# Patient Record
Sex: MALE | Race: BLACK OR AFRICAN AMERICAN
[De-identification: names, ages, dates, MRNs, and addresses within clinical notes are randomized per-mention and may not be internally consistent; named-entity substitution may affect disease eponyms.]

---
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
## 2019-02-02 ENCOUNTER — HOSPITAL ENCOUNTER (INPATIENT)
Dept: HOSPITAL 25 - ED | Age: 19
LOS: 1 days | Discharge: HOME | DRG: 343 | End: 2019-02-03
Attending: SURGERY | Admitting: SURGERY
Payer: COMMERCIAL

## 2019-02-02 DIAGNOSIS — Z79.899: ICD-10-CM

## 2019-02-02 DIAGNOSIS — K35.80: Primary | ICD-10-CM

## 2019-02-02 DIAGNOSIS — I10: ICD-10-CM

## 2019-02-02 LAB
ALBUMIN SERPL BCG-MCNC: 4.9 G/DL (ref 3.2–5.2)
ALBUMIN/GLOB SERPL: 1.9 {RATIO} (ref 1–3)
ALP SERPL-CCNC: 52 U/L (ref 34–104)
ALT SERPL W P-5'-P-CCNC: 11 U/L (ref 7–52)
ANION GAP SERPL CALC-SCNC: 8 MMOL/L (ref 2–11)
AST SERPL-CCNC: 17 U/L (ref 13–39)
BASOPHILS # BLD AUTO: 0 10^3/UL (ref 0–0.2)
BUN SERPL-MCNC: 19 MG/DL (ref 6–24)
BUN/CREAT SERPL: 16.8 (ref 8–20)
CALCIUM SERPL-MCNC: 9.5 MG/DL (ref 8.6–10.3)
CHLORIDE SERPL-SCNC: 105 MMOL/L (ref 101–111)
EOSINOPHIL # BLD AUTO: 0 10^3/UL (ref 0–0.6)
GLOBULIN SER CALC-MCNC: 2.6 G/DL (ref 2–4)
GLUCOSE SERPL-MCNC: 101 MG/DL (ref 70–100)
HCO3 SERPL-SCNC: 24 MMOL/L (ref 22–32)
HCT VFR BLD AUTO: 46 % (ref 42–52)
HGB BLD-MCNC: 15.8 G/DL (ref 14–18)
LYMPHOCYTES # BLD AUTO: 0.3 10^3/UL (ref 1–4.8)
MCH RBC QN AUTO: 29 PG (ref 27–31)
MCHC RBC AUTO-ENTMCNC: 34 G/DL (ref 31–36)
MCV RBC AUTO: 84 FL (ref 80–94)
MONOCYTES # BLD AUTO: 0.5 10^3/UL (ref 0–0.8)
NEUTROPHILS # BLD AUTO: 13.3 10^3/UL (ref 1.5–7.7)
NRBC # BLD AUTO: 0 10^3/UL
NRBC BLD QL AUTO: 0.2
PLATELET # BLD AUTO: 230 10^3/UL (ref 150–450)
POTASSIUM SERPL-SCNC: 4.3 MMOL/L (ref 3.5–5)
PROT SERPL-MCNC: 7.5 G/DL (ref 6.4–8.9)
RBC # BLD AUTO: 5.52 10^6/UL (ref 4–5.4)
SODIUM SERPL-SCNC: 137 MMOL/L (ref 135–145)
WBC # BLD AUTO: 14 10^3/UL (ref 3.5–10.8)

## 2019-02-02 PROCEDURE — 99284 EMERGENCY DEPT VISIT MOD MDM: CPT

## 2019-02-02 PROCEDURE — 83690 ASSAY OF LIPASE: CPT

## 2019-02-02 PROCEDURE — 80053 COMPREHEN METABOLIC PANEL: CPT

## 2019-02-02 PROCEDURE — 83605 ASSAY OF LACTIC ACID: CPT

## 2019-02-02 PROCEDURE — 81003 URINALYSIS AUTO W/O SCOPE: CPT

## 2019-02-02 PROCEDURE — 88304 TISSUE EXAM BY PATHOLOGIST: CPT

## 2019-02-02 PROCEDURE — 36415 COLL VENOUS BLD VENIPUNCTURE: CPT

## 2019-02-02 PROCEDURE — 85025 COMPLETE CBC W/AUTO DIFF WBC: CPT

## 2019-02-02 PROCEDURE — 74177 CT ABD & PELVIS W/CONTRAST: CPT

## 2019-02-02 NOTE — ED
Abdominal Pain/Male





- HPI Summary


HPI Summary: 


A 19 y/o M presents to ED with c/o diffuse suprapubic abd pain onset this AM. 

Pt states he had the pain upon waking today. Associated sx: n/v. He denies appy

, but did have surgery for testicular torsion. He denies testicular swelling. 











- History of Current Complaint


Chief Complaint: EDAbdPain


Stated Complaint: ABD PAIN/NAUSEA


Time Seen by Provider: 02/02/19 20:11


Hx Obtained From: Patient


Onset/Duration: Lasting Hours, Still Present


Timing: Constant


Severity Initially: Moderate


Severity Currently: Severe


Pain Intensity: 9


Pain Scale Used: 0-10 Numeric


Location: Diffuse


Character: Sharp


Associated Signs And Symptoms: Positive: Nausea, Vomiting.  Negative: Other - 

neg: testicular swelling





- Allergies/Home Medications


Allergies/Adverse Reactions: 


 Allergies











Allergy/AdvReac Type Severity Reaction Status Date / Time


 


No Known Allergies Allergy   Verified 02/02/19 19:59














PMH/Surg Hx/FS Hx/Imm Hx


Previously Healthy: Yes


Endocrine/Hematology History: 


   Denies: Hx Diabetes


Cardiovascular History: 


   Denies: Hx Hypertension


Sensory History: 


   Denies: Hx Deafness


Opthamlomology History: 


   Denies: Hx Legally Blind


Infectious Disease History: No


Infectious Disease History: 


   Denies: Traveled Outside the [place] in Last 30 Days





- Family History


Known Family History: Positive: Hypertension





- Social History


Occupation: Student


Lives: Dormitory/Roommates


Alcohol Use: None


Alcohol Amount: denies


Hx Substance Use: No


Substance Use Type: Reports: None


Substance Use Comment - Amount & Last Used: Adderall occassionally


Hx Tobacco Use: No


Smoking Status (MU): Never Smoked Tobacco





Review of Systems


Positive: Abdominal Pain, Vomiting, Nausea


Negative: other - neg: testicular swelling


All Other Systems Reviewed And Are Negative: Yes





Physical Exam





- Summary


Physical Exam Summary: 





Appearance: Well-appearing, Well-nourished, lying in bed comfortably


Skin: Warm, dry, no obvious rash


Eyes: sclera anicteric, no conjunctival pallor


ENT: mucous membranes moist, pharynx appears normal


Neck: Supple, nontender


Respiratory: Clear to auscultation, no signs of respiratory distress


Cardiovascular: Normal S1, S2. No murmurs. Normal distal pulses in tibial and 

radial bilaterally.


Abdomen: Soft, normal active bowel sounds present. RLQ tenderness with some 

guarding. No rebound. 


Musculoskeletal: Normal, Strength/ROM Intact


Neurological: A&Ox3, awake and alert, mentation is normal, speech is fluent and 

appropriate


Psychiatric: affect is normal, does not appear anxious or depressed





Testicular: No edema, enlargement or tenderness. 


Triage Information Reviewed: Yes


Vital Signs On Initial Exam: 


 Initial Vitals











Temp Pulse Resp BP Pulse Ox


 


 99.3 F   122   20   173/110   97 


 


 02/02/19 19:58  02/02/19 19:58  02/02/19 19:58  02/02/19 19:58  02/02/19 19:58











Vital Signs Reviewed: Yes





Diagnostics





- Vital Signs


 Vital Signs











  Temp Pulse Resp BP Pulse Ox


 


 02/02/19 19:58  99.3 F  122  20  173/110  97














- Laboratory


Result Diagrams: 


 02/02/19 20:37





 02/02/19 20:37


Lab Statement: Any lab studies that have been ordered have been reviewed, and 

results considered in the medical decision making process.





- CT


  ** A/P CT


CT Interpretation Completed By: Radiologist


Summary of CT Findings: IMPRESSION:  1. Mildly dilated thickwalled appendix 

concerning for early appendicitis. No.  perforation or abscess.  2. Trace free 

fluid in pelvis. ED provider has reviewed this report.





Re-Evaluation





- Re-Evaluation


  ** 1


Re-Evaluation Time: 21:14


Change: Improved


Comment: Pt feeling improved with medication. ABD exam shows RLQ tenderness 

with some guarding, no rebound. Testicular exam shows no edema, tenderness or 

enlargement.





  ** 2


Re-Evaluation Time: 01:22


Change: Improved


Comment: Discussing CT results and surgery consult with pt, and plans to admit 

and go to OR.





Abdominal Pain Fem Course/Dx





- Course


Course Of Treatment: Pt is a 19 y/o M presenting with diffuse suprapubic abd 

pain onset this AM. Pt states he had the pain upon waking today. Associated sx: 

n/v. He denies appy, but did have surgery for testicular torsion. He denies 

testicular swelling.  Labs are without significant abnormalities. A/P CT 

reveals "1. Mildly dilated thickwalled appendix concerning for early 

appendicitis. No perforation or abscess. 2. Trace free fluid in pelvis.".  

Consulted with Dr. Henry, surgery, who will admit patient and prep for OR.





- Diagnoses


Provider Diagnoses: 


 Acute appendicitis








- Provider Notifications


Discussed Care Of Patient With: Brian Henry - surgery


Time Discussed With Above Provider: 01:21


Instructed by Provider To: Admit As Inpatient





Discharge





- Sign-Out/Discharge


Documenting (check all that apply): Patient Departure - ADMIT, OR


Patient Received Moderate/Deep Sedation with Procedure: No





- Discharge Plan


Condition: Stable


Disposition: ADMITTED TO [facility]





- Billing Disposition and Condition


Condition: STABLE


Disposition: Admitted to [facility]





- Attestation Statements


Document Initiated by Auguste: Yes


Documenting Scribe: Johnna Peralta


Provider For Whom Nandini is Documenting (Include Credential): Dr. Partha Hendricks MD


Scribe Attestation: 


Johnna FIGUEROA, scribed for Dr. Partha Hendricks MD on 02/03/19 at 0526. 


Scribe Documentation Reviewed: Yes


Provider Attestation: 


The documentation as recorded by the Johnna sheldon accurately 

reflects the service I personally performed and the decisions made by me, Dr. Partha Hendricks MD


Status of Scribe Document: Viewed

## 2019-02-03 VITALS — DIASTOLIC BLOOD PRESSURE: 77 MMHG | SYSTOLIC BLOOD PRESSURE: 138 MMHG

## 2019-02-03 PROCEDURE — 0DTJ4ZZ RESECTION OF APPENDIX, PERCUTANEOUS ENDOSCOPIC APPROACH: ICD-10-PCS | Performed by: SURGERY

## 2019-02-03 NOTE — BRIEFOPN
Brief Operative Note





- Surgery


Procedures: 





Pre-OP Diagnoses:   acute appendicitis





Post-op Diagnosis:   same





Procedure:      Laparoscopic appendectomy





Surgeon:       Carl





Asst:       none





Anethesia:       GETA  





EBL:      minimal





IVF:      crystalloid





Specimen:      appendix





Drains:      none

## 2019-02-03 NOTE — HP
H&P (Free Text)


History and Physical: 





I saw and evaluated the patient. H and P dictated.





18 y.o male with HTN and acute appendicitis.





Plan: laparoscopic appendectomy; r/b/a idscussed and pt wishes to proceed.

## 2019-02-03 NOTE — OP
CC:  Willi Zambrano MD; [facility] *

 

DATE OF OPERATION:  02/03/19 - ROOM #332

 

DATE OF BIRTH:  [birth year]

 

SURGEON:  Brian Henry MD.

 

ASSISTANT:  None.

 

ANESTHESIOLOGIST:  Dr. Malave.

 

ANESTHESIA:  General.

 

PRE-OP DIAGNOSIS:  Acute appendicitis.

 

POST-OP DIAGNOSIS:  Acute appendicitis.

 

OPERATIVE PROCEDURE:  Laparoscopic appendectomy.

 

ESTIMATED BLOOD LOSS:  Minimal blood loss.

 

FLUIDS:  Minimal crystalloid fluid given.

 

SPECIMEN:  Appendix.

 

DESCRIPTION OF PROCEDURE:  The patient was brought to the operating room, 
placed on the operating table in supine position.  The patient had already 
received antibiotics.  General anesthesia was induced.  The patient's abdomen 
was clipped of hair and prepped and draped in a standard surgical fashion and a 
time-out was performed.

 

An infraumbilical incision was made.  The skin was elevated and a Veress needle 
inserted into the abdominal cavity, which was then allowed to insufflate to 
pressure of 15 mmHg.  The patient tolerated the insufflation well.  Veress 
needle was moved and a 5 mm OptiView trocar was inserted appropriately into the 
abdomen. Laparoscope was inserted through this.  There was no evidence of 
injury from the trocar insertion.  There was no evidence of bleeding.  
Additional trocars were placed in the following positions:  A 5 mm in a 
suprapubic area and a 5 mm in the left lower quadrant.  Review of the abdomen 
showed some scant free fluid that was nonpurulent.  The appendix was identified 
and was peritonealized at the lateral aspect of the cecum.  It hooked up from 
the base.  It was easily identifiable to the tip.  It was not identifiable 
until we rotated the cecum more medially. Dissection was carried out taking the 
lateral attachments with LigaSure.  We then came along the turn of the appendix 
overlying the cecum and we were able to at this point elongate the inflamed-
appearing appendix.  The LigaSure was used to take the mesentery of the 
appendix in a stepwise state and then we used a 0-Vicryl Endoloop at the base 
of the appendix to healthy tissue.  Another Endoloop was placed and we cut in 
between the site.  The mucosa later was cauterized. The appendix was placed in 
endoscopic retrieval bag.  Reviewed the abdomen and there was no evidence of 
injury.  There was no soilage and we removed the appendix through the 5 mm 
umbilical port site in its endoscopic retrieval bag.  The abdomen was allowed 
to collapse.  Trocar removed under direct vision and all 3 skin incisions were 
reapproximated with 4-0 Monocryl subcuticular sutures followed by Steri-Strips 
and sterile dressing.  The patient tolerated the procedure well and was woken 
in the OR and transferred to the PACU.

 

 [id number]/CPS #: [id number]

ADONIS

## 2019-02-03 NOTE — HP
CC:  Willi Zambrano at [facility].

 

HISTORY AND PHYSICAL:

 

DATE OF ADMISSION:  02/03/19

 

HISTORY OF PRESENT ILLNESS:  Mr. 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 
a history of hypertension, on 2 medications, who presented to the emergency room last night with comp
laints of lower abdominal pain for half a day. Patient's workup in the ER including labs and CAT scan
 were consistent with acute appendicitis and I was contacted.  Patient was admitted to my service in 
the overnight, I see him right now.

 

Patient describes acute onset of pain about 5 a.m. yesterday, woke up, and had intermittent nausea an
d vomiting afterwards as well as loose bowel movements, decreased appetite.  Denied any fevers or chi
lls, but he has had fever in the overnight.  Patient denies any previous similar symptoms.  He was co
ncerned that the pain at first was similar to testicular torsion that he suffered years ago. Patient 
underwent a successful detorsion with suturing without orchiectomy. Patient currently has no pain in 
the scrotum.

 

Pain is relieved with rest, worse with movement, it is improved with narcotics.

 

PAST MEDICAL HISTORY:  Hypertension.

 

PAST SURGICAL HISTORY:  As above.

 

MEDICATIONS:  1.  Amlodipine.

2.  Losartan.  Patient follows [facility] with regards to his hypertension, has undergone multiple labs 
through there.

 

ALLERGIES:  No known drug allergies.

 

FAMILY HISTORY:  Noncontributory.  No family history of ulcerative colitis or Crohn's disease.

 

SOCIAL HISTORY:  Nonsmoker, nondrinker. 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 He is studying [employer].

 

REVIEW OF SYSTEMS:  No headaches, no shortness of breath.  No fevers except now objectively here in [facility].  No GERD, no irritable bowel symptoms.  Loose bowel movements and vomiting as described.
  No bleeding or clotting disorders. Hypertension as described.  No metabolic disorders.  No psychiat
ruby illnesses.  No significant weight loss or weight gain.

 

                               PHYSICAL EXAMINATION

 

VITAL SIGNS:  Temperature 101.8, heart rate 109, blood pressure 150/67. He is alert and oriented x3, 
in no apparent distress.

 

HEAD, EYES, EARS, NOSE, AND THROAT:  Normocephalic, atraumatic.  Sclerae are anicteric.  Mucous membr
anes are dry.

 

NECK:  No lymphadenopathy.

 

LUNGS:  Clear.

 

ABDOMEN:  Soft, nondistended.  Tender in the right lower quadrant without rebound. Positive voluntary
 guarding.  No CVA tenderness.

 

RECTAL EXAM:  Not performed.

 

EXTREMITIES:  Within normal limits with no pitting edema.  Negative Rovsing or psoas sign.

 

 DIAGNOSTIC STUDIES/LAB DATA:  Labs reviewed show white count elevated 14 with a left shift.  No anem
ia. Metabolic panel reviewed creatinine 1.13, which is upper limit of normal with an estimated GFR of
 100.  Lactic acid normal.  Urinalysis with positive ketones.

 

Patient underwent a CAT scan of the abdomen and pelvis.  These images as well as report were reviewed
.  Positive fat, mildly dilated appendix with thick wall.

 

IMPRESSION:  Acute appendicitis, sepsis criteria with fever and elevated heart rate with white count 
elevated with left shift.  Recommendations for antibiotics and has been started.  Patient has already
 received a second dose of Zosyn. I have recommended laparoscopic appendectomy.  I went over the deta
ils of the procedure outlying the laparoscopic approach.  Patient wishes to proceed.  I spoke with po
ssible complications, which include not limited bleeding, infection, bowel injury, abscess formation,
 need for open procedure, need for additional procedures. We also talked about the possible alternati
ves of watchful waiting, and antibiotics alone.  This was not recommended.  Patient did not choose to
 go in this fashion. He maintained n.p.o. status, he is on IV fluids and we will take him emergently 
to the operating room for laparoscopic appendectomy.

 

 

 

[id number]/[facility] #: [id number]

## 2019-02-18 ENCOUNTER — HOSPITAL ENCOUNTER (OUTPATIENT)
Dept: HOSPITAL 25 - ED | Age: 19
Setting detail: OBSERVATION
LOS: 2 days | Discharge: HOME | End: 2019-02-20
Attending: SURGERY | Admitting: SURGERY
Payer: COMMERCIAL

## 2019-02-18 DIAGNOSIS — R10.9: ICD-10-CM

## 2019-02-18 DIAGNOSIS — T81.43XA: Primary | ICD-10-CM

## 2019-02-18 DIAGNOSIS — K65.1: ICD-10-CM

## 2019-02-18 DIAGNOSIS — R19.7: ICD-10-CM

## 2019-02-18 DIAGNOSIS — Z90.89: ICD-10-CM

## 2019-02-18 LAB
ANION GAP SERPL CALC-SCNC: 8 MMOL/L (ref 2–11)
BASOPHILS # BLD AUTO: 0.1 10^3/UL (ref 0–0.2)
BUN SERPL-MCNC: 13 MG/DL (ref 6–24)
BUN/CREAT SERPL: 12.5 (ref 8–20)
CALCIUM SERPL-MCNC: 10 MG/DL (ref 8.6–10.3)
CHLORIDE SERPL-SCNC: 101 MMOL/L (ref 101–111)
EOSINOPHIL # BLD AUTO: 0.1 10^3/UL (ref 0–0.6)
GLUCOSE SERPL-MCNC: 88 MG/DL (ref 70–100)
HCO3 SERPL-SCNC: 28 MMOL/L (ref 22–32)
HCT VFR BLD AUTO: 43 % (ref 42–52)
HGB BLD-MCNC: 14.7 G/DL (ref 14–18)
LYMPHOCYTES # BLD AUTO: 2.1 10^3/UL (ref 1–4.8)
MCH RBC QN AUTO: 28 PG (ref 27–31)
MCHC RBC AUTO-ENTMCNC: 34 G/DL (ref 31–36)
MCV RBC AUTO: 83 FL (ref 80–94)
MONOCYTES # BLD AUTO: 1.4 10^3/UL (ref 0–0.8)
NEUTROPHILS # BLD AUTO: 7.3 10^3/UL (ref 1.5–7.7)
NRBC # BLD AUTO: 0 10^3/UL
NRBC BLD QL AUTO: 0.3
PLATELET # BLD AUTO: 303 10^3/UL (ref 150–450)
POTASSIUM SERPL-SCNC: 3.7 MMOL/L (ref 3.5–5)
RBC # BLD AUTO: 5.23 10^6/UL (ref 4–5.4)
SODIUM SERPL-SCNC: 137 MMOL/L (ref 135–145)
WBC # BLD AUTO: 11 10^3/UL (ref 3.5–10.8)

## 2019-02-18 PROCEDURE — 99284 EMERGENCY DEPT VISIT MOD MDM: CPT

## 2019-02-18 PROCEDURE — 72193 CT PELVIS W/DYE: CPT

## 2019-02-18 PROCEDURE — 36415 COLL VENOUS BLD VENIPUNCTURE: CPT

## 2019-02-18 PROCEDURE — 76705 ECHO EXAM OF ABDOMEN: CPT

## 2019-02-18 PROCEDURE — 96366 THER/PROPH/DIAG IV INF ADDON: CPT

## 2019-02-18 PROCEDURE — 96365 THER/PROPH/DIAG IV INF INIT: CPT

## 2019-02-18 PROCEDURE — 96375 TX/PRO/DX INJ NEW DRUG ADDON: CPT

## 2019-02-18 PROCEDURE — 86140 C-REACTIVE PROTEIN: CPT

## 2019-02-18 PROCEDURE — 74018 RADEX ABDOMEN 1 VIEW: CPT

## 2019-02-18 PROCEDURE — G0378 HOSPITAL OBSERVATION PER HR: HCPCS

## 2019-02-18 PROCEDURE — 80048 BASIC METABOLIC PNL TOTAL CA: CPT

## 2019-02-18 PROCEDURE — 85025 COMPLETE CBC W/AUTO DIFF WBC: CPT

## 2019-02-18 RX ADMIN — OXYCODONE HYDROCHLORIDE AND ACETAMINOPHEN PRN TAB: 5; 325 TABLET ORAL at 22:52

## 2019-02-18 RX ADMIN — PIPERACILLIN AND TAZOBACTAM SCH MLS/HR: 3; .375 INJECTION, POWDER, LYOPHILIZED, FOR SOLUTION INTRAVENOUS; PARENTERAL at 21:39

## 2019-02-18 NOTE — HP
HISTORY AND PHYSICAL:

 

DATE OF ADMISSION:  02/18/19.

 

CHIEF COMPLAINT:  Right lower quadrant abdominal pain.

 

HISTORY OF PRESENT ILLNESS:  Mr. 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 
intermittent crampy abdominal pain and some right lower quadrant discomfort.  
He has not had any fevers, shakes, or chills or nausea, but he did feel some 
anorexia with poor oral intake in the last several days.  He had some looser 
bowel movements later last week, but has not had a bowel movement in several 
days.

 

Of note, he underwent a laparoscopic appendectomy with Dr. Henry on 02/03/19.  
Pathology did show acute early appendicitis and this was done with a 
laparoscope. He did well and was discharged home on postoperative day #1 
without antibiotics.

 

When seen in the emergency room today, he was noted to be afebrile with stable 
vital signs.  Laboratory workup included a white blood cell count of 11,000 
without shift.  He was noted to have a C-reactive protein of 45.

 

He was noted to have some mild tenderness in the right lower quadrant with some 
voluntary muscle rigidity.

 

He underwent an ultrasound of his abdomen.  I did review these studies as well 
as review them with radiologist, Dr. Forde today.  This shows a small volume 
of right lower quadrant free fluid, but in addition there is a thick-walled 3.8 
x 3.2 x 3.7 cm complex fluid and debris collection consistent possibly with 
abscess versus phlegmon.

 

Surgical consultation was obtained.

 

PAST MEDICAL HISTORY:  Hypertension.

 

PAST SURGICAL HISTORY:  Laparoscopic appendectomy.

 

MEDICATIONS:  Include amlodipine and losartan.

 

ALLERGIES:  He has no known drug allergies.

 

SOCIAL HISTORY:  He is a nonsmoker and nondrinker.  He is a sophomore.  He is 
from the [facility] and his parents lived there.

 

REVIEW OF SYSTEMS:  Otherwise unremarkable.

 

                               PHYSICAL EXAMINATION

 

GENERAL:  He is a slender, well-developed, well-nourished male, appears to be 
in no apparent distress.

 

VITAL SIGNS:  Temperature 98.8, pulse 63, blood pressure 148/78.

 

LUNGS:  Clear to auscultation with normal respiratory effort.

 

HEART:  Regular rate and rhythm without murmurs, rubs, or gallops.

 

ABDOMEN:  Soft and nondistended.  He has normoactive bowel sounds throughout.  
They were slightly hyperactive.  He has well-healed laparoscopic incisions 
without hernia or redness.  He has some mild tenderness in the right lower 
quadrant with some voluntary guarding.  There is no peritoneal irritation.  
There is no generalized peritonitis.

 

EXTREMITIES:  Show no cyanosis or edema.

 

 IMPRESSION:  Status post laparoscopic appendectomy almost 2 weeks ago with a 
return to the emergency room, several days of intermittent crampy abdominal 
discomfort and anorexia.  He is noted to have a mild white blood cell count 
elevation and elevated CRP.

 

Ultrasound as per above.

 

I reviewed the ultrasound with Dr. Forde here from Interventional Radiology.  
It is difficult to determine with the ultrasound whether the phlegmon is 
possibly an abscess and there also appears to be some free fluid, which is hard 
to distinguish on the ultrasound.

 

PLAN:

1.  The patient will be admitted to the surgical service in the short-stay unit.

2.  He will be started on IV Zosyn.

3.  He will be kept n.p.o. for now.

4.  IV fluids will be started.

5.  After discussion with Dr. Forde, we will proceed with a CT scan of the 
pelvis with oral contrast, allowing the contrast to pass down through the 
terminal ileum and right colon for optimal visualization and elucidation of a 
possible abscess versus phlegmon.  This may be amenable to percutaneous 
drainage.

 

All of the above was discussed with the patient and his questions were 
answered.  I also offered to call his parents, but he states that they are 
aware of his hospitalization and he did not request me to call them at this 
point.

 

 

 

[id number]/[facility]PS #: [id number]

MTDD

## 2019-02-18 NOTE — ED
Abdominal Pain/Male





- HPI Summary


HPI Summary: 


Pt is a 19 y/o male who presents to the ED c/o abdominal pain. He had an 

appendectomy 2 weeks ago without any complications, and was on post-operative 

pain medications. Pt has been having intermittent diffuse abdominal pain for 

the past 6 days, rated 8/10 in severity at its worst. The pain is made worse 

with movement. He also notes mild diarrhea. Pt denies any N/V, back pain, 

testicular pain, fever, or hematochezia.





- History of Current Complaint


Chief Complaint: EDAbdPain


Stated Complaint: ABD PAIN


Time Seen by Provider: 02/18/19 13:52


Hx Obtained From: Patient


Onset/Duration: Gradual Onset, Lasting Days - 6, Still Present


Timing: Intermittent


Severity Currently: Severe


Pain Intensity: 8


Pain Scale Used: 0-10 Numeric


Location: Diffuse


Radiates: No


Aggravating Factor(s): Movement


Alleviating Factor(s): Nothing


Associated Signs And Symptoms: Positive: Diarrhea.  Negative: Fever, Back Pain, 

Blood in Stool, Nausea, Vomiting





- Allergies/Home Medications


Allergies/Adverse Reactions: 


 Allergies











Allergy/AdvReac Type Severity Reaction Status Date / Time


 


No Known Allergies Allergy   Verified 02/18/19 14:05











Home Medications: 


 Home Medications





Valsartan TAB* [Diovan TAB*] 160 mg PO DAILY 02/18/19 [History Confirmed 02/18/ 19]


amLODIPine TAB* [Norvasc 5 mg TAB*] 7.5 mg PO DAILY 02/18/19 [History Confirmed 

02/18/19]











PMH/Surg Hx/FS Hx/Imm Hx


Endocrine/Hematology History: 


   Denies: Hx Diabetes


Cardiovascular History: 


   Denies: Hx Hypertension


Sensory History: Reports: Hx Contacts or Glasses


   Denies: Hx Legally Blind, Hx Deafness, Hx Hearing Aid


Opthamlomology History: Reports: Hx Contacts or Glasses


   Denies: Hx Legally Blind





- Immunization History


Date of Tetanus Vaccine: UTD


Infectious Disease History: No


Infectious Disease History: 


   Denies: Traveled Outside the [place] in Last 30 Days





- Family History


Known Family History: Positive: Hypertension





- Social History


Alcohol Use: None


Alcohol Amount: denies


Hx Substance Use: No


Substance Use Type: Reports: None


Substance Use Comment - Amount & Last Used: Adderall occassionally


Hx Tobacco Use: No


Smoking Status (MU): Never Smoked Tobacco





Review of Systems


Negative: Fever


Positive: Abdominal Pain, Diarrhea.  Negative: Vomiting, Nausea, Other - 

hematochezia


Negative: other - testicular pain


Negative: Myalgia - back pain


All Other Systems Reviewed And Are Negative: Yes





Physical Exam





- Summary


Physical Exam Summary: 


Appearance: Well appearing, no pain distress


Skin: warm, dry, reflects adequate perfusion


Head/face: normal


Eyes: EOMI, BAKARI


ENT: mucous membranes moist


Neck: supple, non-tender


Respiratory: CTA, breath sounds present


Cardiovascular: RRR, pulses symmetrical 


Abdomen: soft, mild guarding, mild diffuse tenderness worse in the RLQ


Bowel Sounds: present


Musculoskeletal: normal, strength/ROM intact


Neuro: normal, sensory motor intact, A&Ox3


Triage Information Reviewed: Yes


Vital Signs On Initial Exam: 


 Initial Vitals











Temp Pulse Resp BP Pulse Ox


 


 98.6 F   71   14   153/102   99 


 


 02/18/19 13:05  02/18/19 13:05  02/18/19 13:05  02/18/19 13:05  02/18/19 13:05











Vital Signs Reviewed: Yes





Diagnostics





- Vital Signs


 Vital Signs











  Temp Pulse Resp BP Pulse Ox


 


 02/18/19 13:05  98.6 F  71  14  153/102  99














- Laboratory


Result Diagrams: 


 02/18/19 14:15





 02/18/19 14:15


Lab Statement: Any lab studies that have been ordered have been reviewed, and 

results considered in the medical decision making process.





- Radiology


  ** Abdomen XR


Radiology Interpretation Completed By: Radiologist


Summary of Radiographic Findings: NONSPECIFIC BOWEL GAS PATTERN. ED physician 

reviewed radiology report.





- Ultrasound


  ** No standard instances


Ultrasound Interpretation Completed By: Radiologist


Summary of Ultrasound Findings: Abdomen US: The constellation of findings given 

the clinical context favors a small loculated abscess collection at the RIGHT 

lower quadrant with estimated volume of approximately 15-20 mL. Small volume of 

nonloculated RIGHT lower quadrant free fluid. ED physician reviewed radiology 

report.





Abdominal Pain Fem Course/Dx





- Course


Course Of Treatment: Nurse's notes removed.  Patient with pain following 

appendectomy.  Ultrasound reveals a fluid collection in that area that could be 

abscess versus seroma.  Discussed with surgery.  Wbc, CRP are elevated.  They 

will discuss with interventional radiology to see best way to sample/strain 

this.  Dose of IV Zosyn given here.  Admit for further.





- Diagnoses


Differential Diagnosis/HQI/PQRI: Other - Postsurgical constipation, abscess, 

bowel obstruction, ileus


Provider Diagnoses: 


 Abdominal abscess








- Provider Notifications


Discussed Care Of Patient With: Nabil Corbett


Time Discussed With Above Provider: 14:57


Instructed by Provider To: Admit As Inpatient - Give the pt Zosyn.





Discharge





- Sign-Out/Discharge


Documenting (check all that apply): Patient Departure - Admit


Patient Received Moderate/Deep Sedation with Procedure: No





- Discharge Plan


Condition: Fair


Disposition: ADMITTED TO [facility]





- Billing Disposition and Condition


Condition: FAIR


Disposition: Admitted to [facility]





- Attestation Statements


Document Initiated by Scribe: Yes


Documenting Scribe: Viola Zhou


Provider For Whom Scribe is Documenting (Include Credential): Dominick Dixon MD


Scribe Attestation: 


Viola FIGUEROA, scribed for Dominick Dixon MD on 02/18/19 at 1618. 


Scribe Documentation Reviewed: Yes


Provider Attestation: 


The documentation as recorded by the Viola sheldon accurately reflects the 

service I personally performed and the decisions made by me, Dominick Dixon MD


Status of Scribe Document: Viewed

## 2019-02-19 RX ADMIN — PIPERACILLIN AND TAZOBACTAM SCH MLS/HR: 3; .375 INJECTION, POWDER, LYOPHILIZED, FOR SOLUTION INTRAVENOUS; PARENTERAL at 21:03

## 2019-02-19 RX ADMIN — OXYCODONE HYDROCHLORIDE AND ACETAMINOPHEN PRN TAB: 5; 325 TABLET ORAL at 18:22

## 2019-02-19 RX ADMIN — PIPERACILLIN AND TAZOBACTAM SCH MLS/HR: 3; .375 INJECTION, POWDER, LYOPHILIZED, FOR SOLUTION INTRAVENOUS; PARENTERAL at 05:18

## 2019-02-19 RX ADMIN — OXYCODONE HYDROCHLORIDE AND ACETAMINOPHEN PRN TAB: 5; 325 TABLET ORAL at 14:23

## 2019-02-19 RX ADMIN — OXYCODONE HYDROCHLORIDE AND ACETAMINOPHEN PRN TAB: 5; 325 TABLET ORAL at 05:21

## 2019-02-19 RX ADMIN — PIPERACILLIN AND TAZOBACTAM SCH MLS/HR: 3; .375 INJECTION, POWDER, LYOPHILIZED, FOR SOLUTION INTRAVENOUS; PARENTERAL at 14:18

## 2019-02-19 NOTE — PN
Progress Note





- Progress Note


Date of Service: 02/19/19


Note: 





S: Feels about the same. Rates pain at 7/10, but has only req'd Percocet x 2. 

No N/V. Would like to eat (willard clears well). No flatus, but is passing liquid 

stool.





O: 


 Vital Signs - 8 hr











  02/19/19 02/19/19 02/19/19





  03:56 05:18 05:21


 


Temperature 98.2 F  


 


Pulse Rate 55  


 


Respiratory 16 17 17





Rate   


 


Blood Pressure 123/61  





(mmHg)   


 


O2 Sat by Pulse 99  





Oximetry   














  02/19/19 02/19/19





  07:21 07:27


 


Temperature  98.1 F


 


Pulse Rate  53


 


Respiratory 16 16





Rate  


 


Blood Pressure  122/63





(mmHg)  


 


O2 Sat by Pulse  99





Oximetry  








Intake and Output Last 24 Hours











 02/17/19 02/18/19 02/19/19 02/20/19





 06:59 06:59 06:59 06:59


 


Intake Total   1635 105


 


Output Total   700 


 


Balance   935 105


 


Weight   152 lb 


 


Intake:    


 


  IV Fluids   935 


 


    LR   835 


 


  IVPB   100 105


 


    ABX - ZOSYN   100 105


 


  Oral   600 


 


Output:    


 


  Urine   700 








Gen: WN; appears comfortable


Heart: reg


Lungs: clear


Abd: flat, nondistended; +BS; soft; moderate tenderness w/ vol guarding RLQ, 

just over anterior iliac spine; remainder w/o sig tenderness





No labs this a.m.





A: intra-abd/pelvic abscess; small





P: discussed w/ Kiet Henry and Aric; cont IV abx (Zosyn); adv diet; labs 

tomorrow a.m. Pt understands the potential need for perc drainage.

## 2019-02-20 VITALS — SYSTOLIC BLOOD PRESSURE: 125 MMHG | DIASTOLIC BLOOD PRESSURE: 69 MMHG

## 2019-02-20 LAB
BASOPHILS # BLD AUTO: 0 10^3/UL (ref 0–0.2)
EOSINOPHIL # BLD AUTO: 0.2 10^3/UL (ref 0–0.6)
HCT VFR BLD AUTO: 40 % (ref 42–52)
HGB BLD-MCNC: 13.8 G/DL (ref 14–18)
LYMPHOCYTES # BLD AUTO: 1.8 10^3/UL (ref 1–4.8)
MCH RBC QN AUTO: 28 PG (ref 27–31)
MCHC RBC AUTO-ENTMCNC: 35 G/DL (ref 31–36)
MCV RBC AUTO: 81 FL (ref 80–94)
MONOCYTES # BLD AUTO: 0.8 10^3/UL (ref 0–0.8)
NEUTROPHILS # BLD AUTO: 4.1 10^3/UL (ref 1.5–7.7)
NRBC # BLD AUTO: 0 10^3/UL
NRBC BLD QL AUTO: 0.1
PLATELET # BLD AUTO: 264 10^3/UL (ref 150–450)
RBC # BLD AUTO: 4.91 10^6/UL (ref 4–5.4)
WBC # BLD AUTO: 6.9 10^3/UL (ref 3.5–10.8)

## 2019-02-20 RX ADMIN — OXYCODONE HYDROCHLORIDE AND ACETAMINOPHEN PRN TAB: 5; 325 TABLET ORAL at 01:31

## 2019-02-20 RX ADMIN — OXYCODONE HYDROCHLORIDE AND ACETAMINOPHEN PRN TAB: 5; 325 TABLET ORAL at 05:46

## 2019-02-20 RX ADMIN — PIPERACILLIN AND TAZOBACTAM SCH MLS/HR: 3; .375 INJECTION, POWDER, LYOPHILIZED, FOR SOLUTION INTRAVENOUS; PARENTERAL at 05:46

## 2019-02-20 NOTE — DS
CC:  [facility] *

 

DISCHARGE SUMMARY:

 

DATE OF ADMISSION:

 

DATE OF DISCHARGE:  02/20/19

 

HOSPITAL COURSE:  is a 18-year-old gentleman, [employer] student, who 
presented to our institution on 02/03/19 with abdominal pain, was worked up 
with a diagnosis consistent with acute appendicitis.  He went to the operating 
room on 02/03/19 and underwent a laparoscopic appendectomy.  Please see report 
for details. The patient was discharged from the PACU on no antibiotics for 
what appeared to be an appendicitis.

 

The patient did well in the week postop and then starting Wednesday last week 
started having crampy abdominal pain, mostly in the right lower quadrant, 
decreased appetite.  He denied any fever or chills.  He was having loose bowel 
movements. When pain did not improve, he presented to the emergency room where 
a workup was consistent with fluid collection in the right lower quadrant cecal 
area.  The patient was admitted for postoperative abscess.  These images were 
reviewed.  This case was discussed with the admitting doctor.  I was aware that 
the patient was admitted and started on antibiotics and saw him today on 
hospital day 3.

 

The patient has had no fever, no tachycardia during his hospitalization.  He 
presented with a white count of 11 and now it has normalized to 7 without left 
shift.  CRP of 45, is improving to 31 during his admission.  The patient's 
appetite is improving and he did have 3 loose bowel movements yesterday.  He is 
passing minimal flatus.  The intermittent crampy pain still occurs, but it is 
less frequent and it is tolerable.  The patient has been treated with Zosyn.  
He has no allergies.

 

PHYSICAL EXAMINATION ON DAY OF DISCHARGE:  Vital Signs:  The patient is 
afebrile. Vital signs are stable.  General:  Alert and oriented x3, in no 
apparent distress. Lungs:  Clear to auscultation bilaterally.  Abdomen:  Soft, 
nondistended.  Tender in his right lower quadrant on deep palpation.  Rectal:  
His rectal exam not performed.  Extremities:  Within normal limits.

 

LABORATORY DATA:  Reviewed.

 

IMPRESSION:  Abscess, status post laparoscopic appendectomy.  The patient is 
hemodynamically stable.  I do not feel this would benefit from drainage at this 
time, as the patient is improving and this does represent small collection.  My 
recommendation is discharge home, for 7 days of antibiotics and close followup 
tomorrow.  We talked about the possibility of keeping in him on 24 more hours 
in the hospital, but the patient does want to make some of his classes and I do 
understand he has missed some classes due to his issues.  So, for this reason,  
an early appointment will be made in my office tomorrow.  The patient will be 
discharged today.  Course of antibiotics was given and sent to [facility] 
of [facility] 875 twice a day for 7 days.  The patient's appointment was made 
for tomorrow at my office at 10:30.  This was all discussed with the patient.  
There is no wound care.  He will be going home and followup tomorrow.

 

 [id number]/[facility] #: [id number]

ADONIS

## 2019-02-20 NOTE — PN
Progress Note





- Progress Note


Date of Service: 02/20/19


Note: 





S: Day #2 Zosyn. Feels about the same, or pain possibly a little worse. Using 

only Percocet. No N/V. Loose, watery stools.





O:


 Vital Signs - 8 hr











  02/20/19 02/20/19 02/20/19





  01:31 03:31 03:59


 


Temperature   97.8 F


 


Pulse Rate   51


 


Respiratory 20 16 16





Rate   


 


Blood Pressure   134/68





(mmHg)   


 


O2 Sat by Pulse   100





Oximetry   














  02/20/19 02/20/19 02/20/19





  05:46 07:41 08:37


 


Temperature  98.2 F 


 


Pulse Rate  51 


 


Respiratory 18 16 16





Rate   


 


Blood Pressure  124/63 





(mmHg)   


 


O2 Sat by Pulse  98 





Oximetry   








Intake and Output Last 24 Hours











 02/18/19 02/19/19 02/20/19 02/21/19





 06:59 06:59 06:59 06:59


 


Intake Total  1635 1295 


 


Output Total  700 0 


 


Balance  935 1295 


 


Weight  152 lb  


 


Intake:    


 


  IV Fluids  935  


 


    LR  835  


 


  IVPB  100 105 


 


    ABX - ZOSYN  100 105 


 


  Oral  600 1190 


 


Output:    


 


  Urine  700 0 


 


Other:    


 


  Estimated Void   Large 


 


  # Bowel Movements   0 


 


  Estimated Stool Amount   Medium 


 


  # Voids   1 








Gen: appears comfortable, NAD


Heart: reg


Lungs: clear ant


Abd: nondistended; +BS; firmness/tenderness RLQ; remainder soft,nontender





Labs: 


 Laboratory Tests











  02/18/19 02/18/19 02/20/19





  14:15 14:15 04:41


 


WBC  11.0 H   6.9


 


C-Reactive Protein   45.42 H 














  02/20/19





  04:41


 


WBC 


 


C-Reactive Protein  31.61 H














A: intra-abd/pelvic abscess, post appendectomy; no sig change clinically, 

though lab parameters are favorable





P: await re-exam by Dr. Henry this a.m. and discuss plan

## 2020-04-25 ENCOUNTER — HOSPITAL ENCOUNTER (EMERGENCY)
Dept: HOSPITAL 25 - ED | Age: 20
LOS: 1 days | Discharge: HOME | End: 2020-04-26
Payer: COMMERCIAL

## 2020-04-25 VITALS — DIASTOLIC BLOOD PRESSURE: 70 MMHG | SYSTOLIC BLOOD PRESSURE: 145 MMHG

## 2020-04-25 DIAGNOSIS — I10: ICD-10-CM

## 2020-04-25 DIAGNOSIS — K21.9: ICD-10-CM

## 2020-04-25 DIAGNOSIS — R10.9: Primary | ICD-10-CM

## 2020-04-25 LAB
ALBUMIN SERPL BCG-MCNC: 4.9 G/DL (ref 3.2–5.2)
ALBUMIN/GLOB SERPL: 1.4 {RATIO} (ref 1–3)
ALP SERPL-CCNC: 72 U/L (ref 34–104)
ALT SERPL W P-5'-P-CCNC: 16 U/L (ref 7–52)
ANION GAP SERPL CALC-SCNC: 8 MMOL/L (ref 2–11)
AST SERPL-CCNC: 20 U/L (ref 13–39)
BASOPHILS # BLD AUTO: 0 10^3/UL (ref 0–0.2)
BUN SERPL-MCNC: 18 MG/DL (ref 6–24)
BUN/CREAT SERPL: 15.3 (ref 8–20)
CALCIUM SERPL-MCNC: 10 MG/DL (ref 8.6–10.3)
CHLORIDE SERPL-SCNC: 102 MMOL/L (ref 101–111)
EOSINOPHIL # BLD AUTO: 0.1 10^3/UL (ref 0–0.6)
GLOBULIN SER CALC-MCNC: 3.4 G/DL (ref 2–4)
GLUCOSE SERPL-MCNC: 87 MG/DL (ref 70–100)
HCO3 SERPL-SCNC: 27 MMOL/L (ref 22–32)
HCT VFR BLD AUTO: 46 % (ref 42–52)
HGB BLD-MCNC: 16.3 G/DL (ref 14–18)
LYMPHOCYTES # BLD AUTO: 1.8 10^3/UL (ref 1–4.8)
MCH RBC QN AUTO: 29 PG (ref 27–31)
MCHC RBC AUTO-ENTMCNC: 36 G/DL (ref 31–36)
MCV RBC AUTO: 82 FL (ref 80–94)
MONOCYTES # BLD AUTO: 0.6 10^3/UL (ref 0–0.8)
NEUTROPHILS # BLD AUTO: 2.7 10^3/UL (ref 1.5–7.7)
NRBC # BLD AUTO: 0 10^3/UL
NRBC BLD QL AUTO: 0.3
PLATELET # BLD AUTO: 225 10^3/UL (ref 150–450)
POTASSIUM SERPL-SCNC: 3.8 MMOL/L (ref 3.5–5)
PROT SERPL-MCNC: 8.3 G/DL (ref 6.4–8.9)
RBC # BLD AUTO: 5.57 10^6 /UL (ref 4.18–5.48)
SODIUM SERPL-SCNC: 137 MMOL/L (ref 135–145)
WBC # BLD AUTO: 5.1 10^3/UL (ref 3.5–10.8)
WBC UR QL AUTO: (no result)

## 2020-04-25 PROCEDURE — 86140 C-REACTIVE PROTEIN: CPT

## 2020-04-25 PROCEDURE — 87086 URINE CULTURE/COLONY COUNT: CPT

## 2020-04-25 PROCEDURE — 80053 COMPREHEN METABOLIC PANEL: CPT

## 2020-04-25 PROCEDURE — 96361 HYDRATE IV INFUSION ADD-ON: CPT

## 2020-04-25 PROCEDURE — 81003 URINALYSIS AUTO W/O SCOPE: CPT

## 2020-04-25 PROCEDURE — 96374 THER/PROPH/DIAG INJ IV PUSH: CPT

## 2020-04-25 PROCEDURE — 99282 EMERGENCY DEPT VISIT SF MDM: CPT

## 2020-04-25 PROCEDURE — 36415 COLL VENOUS BLD VENIPUNCTURE: CPT

## 2020-04-25 PROCEDURE — 83690 ASSAY OF LIPASE: CPT

## 2020-04-25 PROCEDURE — 85025 COMPLETE CBC W/AUTO DIFF WBC: CPT

## 2020-04-25 NOTE — ED
Abdominal Pain/Male





- HPI Summary


HPI Summary: 


20 y/o male presented to [facility] for mid abdomen pain rated 5/10 present for 4 

days. Pt endorses abd pain, nausea, vomiting urges, decreased oral intake, and 

diarrhea. Vomiting urges do not produce vomit due to decreased oral intake. He 

denies fever, blood in stool, pain urinating, and hematuria. Pt has not had a 

bowel movement recently and has not eaten for 2 days. He has not had this pain 

before. He also notes confusion but attributes this to stress, per a 

conversation with his PCP. No travel or sick contacts. Pt notes a cough 2 weeks 

ago. Hx of HTN controlled by medication and appendectomy noted. No Hx of 

stomach ulcers, UC, or Crohns. FHx of HTN noted. Pt does not smoke cigarettes 

and used to drink alcohol but does not anymore.





- History of Current Complaint


Stated Complaint: ABDOMINAL PAIN PER EMS


Time Seen by Provider: 04/25/20 09:17


Hx Obtained From: Patient


Onset/Duration: Lasting Days, Still Present


Timing: Lasting Days


Severity Currently: Moderate


Pain Intensity: 5


Pain Scale Used: 0-10 Numeric


Location: Other - mid abdomen


Aggravating Factor(s): Nothing


Alleviating Factor(s): Nothing


Associated Signs And Symptoms: Positive: Decreased Appetite, Nausea, Vomiting - 

urges with no vomit, Diarrhea.  Negative: Fever, Blood in Stool, Urinary 

Symptoms





- Allergies/Home Medications


Allergies/Adverse Reactions: 


 Allergies











Allergy/AdvReac Type Severity Reaction Status Date / Time


 


No Known Allergies Allergy   Verified 02/18/19 14:05











Home Medications: 


 Home Medications





Valsartan TAB* [Diovan TAB*] 160 mg PO DAILY 02/18/19 [History Confirmed 02/18/ 19]


amLODIPine TAB* [Norvasc 5 mg TAB*] 7.5 mg PO DAILY 02/18/19 [History Confirmed 

02/18/19]


Amoxicillin/Clavulanate TAB* [Augmentin *] 875 mg PO BID #14 tab 02/20/ 19 [Rx]


Oxycodone HCl/Acetaminophen [Percocet 5-325 mg Tablet] 1 each PO Q4H PRN #15 

tablet MDD 6 02/20/19 [Rx]











PMH/Surg Hx/FS Hx/Imm Hx


Endocrine/Hematology History: 


   Denies: Hx Diabetes


Cardiovascular History: Reports: Hx Hypertension


GI History: Reports: Hx Gastroesophageal Reflux Disease


Sensory History: Reports: Hx Contacts or Glasses


   Denies: Hx Legally Blind, Hx Deafness, Hx Hearing Aid


Opthamlomology History: Reports: Hx Contacts or Glasses


   Denies: Hx Legally Blind





- Immunization History


Date of Tetanus Vaccine: UTD





- Family History


Known Family History: Positive: Hypertension





- Social History


Alcohol Use: Rare


Alcohol Amount: denies


Hx Substance Use: No


Substance Use Type: Reports: None


Substance Use Comment - Amount & Last Used: Adderall occassionally


Hx Tobacco Use: No


Smoking Status (MU): Never Smoked Tobacco





Review of Systems


Negative: Fever


Gastrointestinal: Other - negative - decreased oral intake


Positive: Abdominal Pain - mid abdomen, Vomiting - urges with no vomit, Diarrhea

, Nausea, Other - positive - decreased oral intake


Negative: dysuria, hematuria


All Other Systems Reviewed And Are Negative: Yes





Physical Exam





- Summary


Physical Exam Summary: 


Constitutional: Well-developed, Well-nourished, Alert. (-) Distressed


Skin: Warm, Dry


HENT: Normocephalic; Atraumatic


Eyes: Conjunctiva normal


Neck: Musculoskeletal ROM normal neck. (-) JVD, (-) Stridor, (-) Tracheal 

deviation


Cardio: Rhythm regular, rate normal, Heart sounds normal; Intact distal pulses; 

The pedal pulses are 2+ and symmetric. Radial pulses are 2+ and symmetric. (-) 

Murmur


Pulmonary/Chest wall: Effort normal. (-) Respiratory distress, (-) Wheezes, (-) 

Rales


Abd: Soft, (-) tenderness to palpation, (-) Distension, (-) Guarding, (-) 

Rebound


Musculoskeletal: (-) Edema


Lymph: (-) Cervical adenopathy


Neuro: Alert, Oriented x3


Psych: Mood and affect Normal


Triage Information Reviewed: Yes


Vital Signs Reviewed: Yes





Procedures





- Sedation


Patient Received Moderate/Deep Sedation with Procedure: No





Diagnostics





- Laboratory


Result Diagrams: 


 04/25/20 09:40





 04/25/20 09:40


Lab Statement: Any lab studies that have been ordered have been reviewed, and 

results considered in the medical decision making process.





Abdominal Pain Male Course/Dx





- Course


Course Of Treatment: 20 y/o male presented to [facility] for mid abdomen pain rated 5

/10 present for 4 days. Pt endorses abd pain, nausea, vomiting urges, decreased 

oral intake, and diarrhea. Vomiting urges do not produce vomit due to decreased 

oral intake. He denies fever, blood in stool, pain urinating, and hematuria. Pt 

has not had a bowel movement recently and has not eaten for 2 days. He has not 

had this pain before. He also notes confusion but attributes this to stress, 

per a conversation with his PCP. No travel or sick contacts. Pt notes a cough 2 

weeks ago. Hx of HTN controlled by medication and appendectomy noted. No Hx of 

stomach ulcers, UC, or Crohns. FHx of HTN noted.  Exam showed was normal.  Labs 

showed RBC 5.57, creatinine 1.18, bilirubin 1.10, and urine ketones.  Pt was 

given 30mg Toradol.  Abdominal exam rather benign.  Patient tolerating by mouth 

liquids.  No indication for imaging at this time.  Patient discharged home, has 

PCP for follow-up.  Pt was diagnosed with Abdominal pain; and discharged to 

home.





- Diagnoses


Provider Diagnoses: 


 Abdominal pain








- Critical Care Time


Critical Care Statement: Critical care time is provided exclusive of any time 

spent performing procedures.





Discharge ED





- Sign-Out/Discharge


Documenting (check all that apply): Patient Departure - dc





- Discharge Plan


Condition: Stable


Disposition: HOME


Patient Education Materials:  Acute Abdominal Pain (ED)


Referrals: 


Shi Shelton DO [Primary Care Provider] - 


Additional Instructions: 


Follow up with your primary care provider on Monday. If you experience new or 

worsening symptoms please return to the ER.





- Billing Disposition and Condition


Condition: STABLE


Disposition: Home





- Attestation Statements


Document Initiated by Nandini: Yes


Documenting Scribe: Devonte Sequeira


Provider For Whom Nandini is Documenting (Include Credential): Alex Landry DO


Scribe Attestation: 


Devonte FIGUEROA scribed for Alex Landry DO on 04/25/20 at 

1245. 


Scribe Documentation Reviewed: Yes


Provider Attestation: 


The documentation as recorded by the Devonte sheldon accurately 

reflects the service I personally performed and the decisions made by me, 

lAex Landry DO


Status of Scrkam Document: Viewed

## 2020-04-26 ENCOUNTER — HOSPITAL ENCOUNTER (EMERGENCY)
Dept: HOSPITAL 25 - ED | Age: 20
Discharge: HOME | End: 2020-04-26
Payer: COMMERCIAL

## 2020-04-26 VITALS — SYSTOLIC BLOOD PRESSURE: 152 MMHG | DIASTOLIC BLOOD PRESSURE: 114 MMHG

## 2020-04-26 DIAGNOSIS — R00.2: ICD-10-CM

## 2020-04-26 DIAGNOSIS — F32.9: ICD-10-CM

## 2020-04-26 DIAGNOSIS — Z79.899: ICD-10-CM

## 2020-04-26 DIAGNOSIS — F41.9: Primary | ICD-10-CM

## 2020-04-26 DIAGNOSIS — I10: ICD-10-CM

## 2020-04-26 DIAGNOSIS — K21.9: ICD-10-CM

## 2020-04-26 PROCEDURE — 80307 DRUG TEST PRSMV CHEM ANLYZR: CPT

## 2020-04-26 PROCEDURE — 81003 URINALYSIS AUTO W/O SCOPE: CPT

## 2020-04-26 PROCEDURE — 99285 EMERGENCY DEPT VISIT HI MDM: CPT

## 2020-04-26 PROCEDURE — 93005 ELECTROCARDIOGRAM TRACING: CPT

## 2020-04-26 PROCEDURE — G0480 DRUG TEST DEF 1-7 CLASSES: HCPCS

## 2020-04-26 NOTE — ED
Psychiatric Complaint





- HPI Summary


HPI Summary: 


19 year old male presents with increasing anxiety for the past 5 days.  He 

states that he has been feeling off.  He states he has been having 

palpitations. he denies any chest pressures or shortness of breath.  He states 

that he feels queasy.  Denies any bowel pain.  No nausea or vomiting.  States 

he did have pain earlier and was evaluated here and had a negative workup.  The 

pain has resolved and just feels unwell.  He is also been having suicidal 

thoughts.  He denies any specific plan.  He is not homicidal.  He denies 

alcohol 6 days ago.  He has not used any drug or alcohol use in the past 5 days.








- History Of Current Complaint


Chief Complaint: EDSuicidal


Time Seen by Provider: 04/26/20 01:04





- Allergies/Home Medications


Allergies/Adverse Reactions: 


 Allergies











Allergy/AdvReac Type Severity Reaction Status Date / Time


 


No Known Allergies Allergy   Verified 04/26/20 01:07











Home Medications: 


 Home Medications





Valsartan TAB* [Diovan TAB*] 160 mg PO DAILY 02/18/19 [History Confirmed 04/26/ 20]


amLODIPine TAB* [Norvasc 5 mg TAB*] 7.5 mg PO DAILY 02/18/19 [History Confirmed 

04/26/20]


LORazepam TAB(*) [Ativan 0.5 MG TAB (*)] 0.5 mg PO Q12H PRN #4 tab MDD 2 04/26/ 20 [Rx]


Lithium Carbonate 300 mg cap 300 mg PO DAILY 04/26/20 [History Confirmed 04/26/ 20]


Xanax TAB* 0.5 mg PO DAILY PRN 04/26/20 [History Confirmed 04/26/20]


busPIRone TAB* 10 mg PO BID 04/26/20 [History Confirmed 04/26/20]











PMH/Surg Hx/FS Hx/Imm Hx


Endocrine/Hematology History: 


   Denies: Hx Diabetes


Cardiovascular History: Reports: Hx Hypertension


GI History: Reports: Hx Gastroesophageal Reflux Disease


Sensory History: Reports: Hx Contacts or Glasses


   Denies: Hx Legally Blind, Hx Deafness, Hx Hearing Aid


Opthamlomology History: Reports: Hx Contacts or Glasses


   Denies: Hx Legally Blind





- Immunization History


Date of Tetanus Vaccine: UTD


Infectious Disease History: No


Infectious Disease History: 


   Denies: Traveled Outside the [place] in Last 30 Days





- Family History


Known Family History: Positive: Hypertension





- Social History


Alcohol Use: Rare


Alcohol Amount: denies


Hx Substance Use: No


Substance Use Type: Reports: None


Substance Use Comment - Amount & Last Used: Adderall occassionally


Hx Tobacco Use: No


Smoking Status (MU): Never Smoked Tobacco





Review of Systems


Negative: Fever


Positive: Palpitations.  Negative: Chest Pain


Negative: Shortness Of Breath


Positive: Anxious, Depressed


All Other Systems Reviewed And Are Negative: Yes





Physical Exam


Triage Information Reviewed: Yes


Vital Signs On Initial Exam: 


 Initial Vitals











Temp Pulse Resp BP Pulse Ox


 


 99.5 F   93   20   170/110   99 


 


 04/26/20 00:57  04/26/20 00:57  04/26/20 00:57  04/26/20 00:57  04/26/20 00:57











Vital Signs Reviewed: Yes


Appearance: Positive: Well-Appearing


Skin: Positive: Warm, Dry


Head/Face: Positive: Normal Head/Face Inspection


Eyes: Positive: Normal, Conjunctiva Clear


Respiratory/Lung Sounds: Positive: Clear to Auscultation, Breath Sounds Present


Cardiovascular: Positive: Normal, RRR


Abdomen Description: Positive: Nontender, Soft


Bowel Sounds: Positive: Present


Musculoskeletal: Positive: Normal


Neurological: Positive: Normal


Psychiatric: Positive: Anxious





Procedures





- Sedation


Patient Received Moderate/Deep Sedation with Procedure: No





Diagnostics





- Vital Signs


 Vital Signs











  Temp Pulse Resp BP Pulse Ox


 


 04/26/20 00:57  99.5 F  93  20  170/110  99














- Laboratory


Lab Statement: Any lab studies that have been ordered have been reviewed, and 

results considered in the medical decision making process.





- EKG


  ** No standard instances


Cardiac Rate: NL


EKG Rhythm: Sinus Rhythm


Summary of EKG Findings: sinus rhythm





Re-Evaluation





- Re-Evaluation


  ** First Eval


Re-Evaluation Time: 02:30


Change: Improved


Comment: symptoms resolved, feeling better





Course/Dx





- Course


Course Of Treatment: 19 year old male presents with increasing anxiety for the 

past 5 days.  He states that he has been feeling off.  He states he has been 

having palpitations. he denies any chest pressures or shortness of breath.  He 

states that he feels queasy.  Denies any bowel pain.  No nausea or vomiting.  

States he did have pain earlier and was evaluated here and had a negative 

workup.  The pain has resolved and just feels unwell.  He is also been having 

suicidal thoughts.  He denies any specific plan.  He is not homicidal.  He 

denies alcohol 6 days ago.  He has not used any drug or alcohol use in the past 

5 days.  on exam appears anxious. ekg sinus rhythm. lab work yesterday wnl. 

gave ativan. patient is medically clear for mental health. per dr townsend can be 

discharge with script for ativan. gave script. patient has follow up later this 

week for medication refill.





- Differential Dx/Clinical Impression


Differential Diagnosis/HQI/PQRI: Positive: Anxiety, Depression, Suicidal 

Ideation


Provider Diagnosis: 


 Anxiety








- Critical Care Time


Critical Care Statement: Critical care time is provided exclusive of any time 

spent performing procedures.





Discharge ED





- Sign-Out/Discharge


Documenting (check all that apply): Patient Departure





- Discharge Plan


Condition: Stable


Disposition: HOME


Prescriptions: 


LORazepam TAB(*) [Ativan 0.5 MG TAB (*)] 0.5 mg PO Q12H PRN #4 tab MDD 2


 PRN Reason: Anxiety


Referrals: 


Shi Shelton DO [Primary Care Provider] - 





- Billing Disposition and Condition


Condition: STABLE


Disposition: Home